# Patient Record
Sex: FEMALE | Race: BLACK OR AFRICAN AMERICAN | NOT HISPANIC OR LATINO | Employment: PART TIME | URBAN - METROPOLITAN AREA
[De-identification: names, ages, dates, MRNs, and addresses within clinical notes are randomized per-mention and may not be internally consistent; named-entity substitution may affect disease eponyms.]

---

## 2024-08-16 ENCOUNTER — APPOINTMENT (OUTPATIENT)
Dept: OBSTETRICS AND GYNECOLOGY | Facility: CLINIC | Age: 34
End: 2024-08-16
Payer: COMMERCIAL

## 2024-09-20 ENCOUNTER — APPOINTMENT (OUTPATIENT)
Dept: OBSTETRICS AND GYNECOLOGY | Facility: CLINIC | Age: 34
End: 2024-09-20
Payer: COMMERCIAL

## 2025-03-17 ENCOUNTER — APPOINTMENT (OUTPATIENT)
Dept: OBSTETRICS AND GYNECOLOGY | Facility: CLINIC | Age: 35
End: 2025-03-17
Payer: COMMERCIAL

## 2025-03-17 VITALS
DIASTOLIC BLOOD PRESSURE: 80 MMHG | HEIGHT: 63 IN | SYSTOLIC BLOOD PRESSURE: 120 MMHG | BODY MASS INDEX: 38.52 KG/M2 | WEIGHT: 217.4 LBS

## 2025-03-17 DIAGNOSIS — F43.21 GRIEF ASSOCIATED WITH LOSS OF FETUS: ICD-10-CM

## 2025-03-17 DIAGNOSIS — R19.00 PELVIC FULLNESS IN FEMALE: ICD-10-CM

## 2025-03-17 DIAGNOSIS — Z01.419 ENCOUNTER FOR ANNUAL ROUTINE GYNECOLOGICAL EXAMINATION: Primary | ICD-10-CM

## 2025-03-17 PROCEDURE — 87624 HPV HI-RISK TYP POOLED RSLT: CPT

## 2025-03-17 PROCEDURE — 3008F BODY MASS INDEX DOCD: CPT | Performed by: OBSTETRICS & GYNECOLOGY

## 2025-03-17 PROCEDURE — 99395 PREV VISIT EST AGE 18-39: CPT | Performed by: OBSTETRICS & GYNECOLOGY

## 2025-03-17 PROCEDURE — 88175 CYTOPATH C/V AUTO FLUID REDO: CPT

## 2025-03-17 ASSESSMENT — ENCOUNTER SYMPTOMS
LOSS OF SENSATION IN FEET: 0
DEPRESSION: 0
OCCASIONAL FEELINGS OF UNSTEADINESS: 0

## 2025-03-17 ASSESSMENT — PATIENT HEALTH QUESTIONNAIRE - PHQ9
1. LITTLE INTEREST OR PLEASURE IN DOING THINGS: NOT AT ALL
2. FEELING DOWN, DEPRESSED OR HOPELESS: NOT AT ALL
SUM OF ALL RESPONSES TO PHQ9 QUESTIONS 1 AND 2: 0

## 2025-03-17 ASSESSMENT — COLUMBIA-SUICIDE SEVERITY RATING SCALE - C-SSRS
1. IN THE PAST MONTH, HAVE YOU WISHED YOU WERE DEAD OR WISHED YOU COULD GO TO SLEEP AND NOT WAKE UP?: NO
6. HAVE YOU EVER DONE ANYTHING, STARTED TO DO ANYTHING, OR PREPARED TO DO ANYTHING TO END YOUR LIFE?: NO
2. HAVE YOU ACTUALLY HAD ANY THOUGHTS OF KILLING YOURSELF?: NO

## 2025-03-17 NOTE — PATIENT INSTRUCTIONS
Thanks for coming in today for your annual GYN exam.      A Pap smear was sent.  Results should be available in the next few weeks.      Consider follow-up with one of our psychologist to help with the loss of a pregnancy.      Arrange to have an ultrasound performed to evaluate for possible fibroid felt on today's exam.      Follow-up with your PCP and other healthcare specialist as needed.      Feel free to call the office with any problems, questions or concerns prior to your next scheduled visit.

## 2025-05-09 ENCOUNTER — OFFICE VISIT (OUTPATIENT)
Dept: OBSTETRICS AND GYNECOLOGY | Facility: CLINIC | Age: 35
End: 2025-05-09
Payer: COMMERCIAL

## 2025-05-09 VITALS
HEIGHT: 63 IN | BODY MASS INDEX: 38.34 KG/M2 | SYSTOLIC BLOOD PRESSURE: 124 MMHG | DIASTOLIC BLOOD PRESSURE: 80 MMHG | WEIGHT: 216.4 LBS

## 2025-05-09 DIAGNOSIS — R93.5 ABNORMAL ULTRASOUND OF ENDOMETRIUM: ICD-10-CM

## 2025-05-09 DIAGNOSIS — N83.202 CYST OF LEFT OVARY: ICD-10-CM

## 2025-05-09 DIAGNOSIS — D21.9 FIBROIDS: Primary | ICD-10-CM

## 2025-05-09 PROCEDURE — 3008F BODY MASS INDEX DOCD: CPT | Performed by: OBSTETRICS & GYNECOLOGY

## 2025-05-09 PROCEDURE — 99214 OFFICE O/P EST MOD 30 MIN: CPT | Performed by: OBSTETRICS & GYNECOLOGY

## 2025-05-09 ASSESSMENT — ENCOUNTER SYMPTOMS
EYES NEGATIVE: 0
ENDOCRINE NEGATIVE: 0
CARDIOVASCULAR NEGATIVE: 0
PSYCHIATRIC NEGATIVE: 0
RESPIRATORY NEGATIVE: 0
CONSTITUTIONAL NEGATIVE: 0
GASTROINTESTINAL NEGATIVE: 0
HEMATOLOGIC/LYMPHATIC NEGATIVE: 0
NEUROLOGICAL NEGATIVE: 0
ALLERGIC/IMMUNOLOGIC NEGATIVE: 0
MUSCULOSKELETAL NEGATIVE: 0

## 2025-05-09 ASSESSMENT — PAIN SCALES - GENERAL: PAINLEVEL_OUTOF10: 0-NO PAIN

## 2025-05-09 NOTE — PROGRESS NOTES
Assessment and Plan:  Dahiana Blanco is a 33 y/o obese  woman presenting today for follow up.     Diagnoses:  #1 Fibroids  #2 Cyst of left ovary  #3 Abnormal ultrasound of endometrium     Assessment/Plan:  1. Fibroid uterus  - Discussed with patient at length.  - [employer] pamphlet about fibroids provided.  - Patient is having normal cycles without any pelvic pain or pressure.    2. Cystic foci in the endometrium noted on recent ultrasound   - Discussed with patient findings.  - Discussed with patient management options, either follow up with ultrasound, endometrial sampling which was recommended to rule out hyperplasia or malignancy.  - Discussed with patient hysteroscopy in the office versus D&C with hysteroscopy.  - Will get a follow up ultrasound in 6 to 8 weeks to recheck.  - Patient to return to the office for likely endometrial biopsy versus hysteroscopy with biopsy.  - May also need to consider a sonohistogram.    3. Left ovarian cysts  - Discussed with patient repeat ultrasound in 2-3 cycles to check for stability or resolution.    4. Fertility  - Briefly discussed with patient AMA and high risk pregnancy.    Follow up with Dr. Rajput.    Vivian[facility] Attestation  By signing my name below, I, Justina Taveras, attest that this documentation has been prepared under the direction and in the presence of Nicky Rajput MD on 2025 at 6:29 PM.     HPI:   Dahiana Blanco is a 33 y/o obese  woman presenting today for follow up. She was seen for her annual exam in March. Fibroids were felt on exam and an ultrasound was ordered.     The patient eventually had a follow up ultrasound on May 2, 2025 at an outside facility that showed a 12.5 cm multifibroid uterus, the largest measuring 6.3 cm. The endometrial stripe was 12 mm and there was concern about an echogenic foci measuring 1.1 cm. The left ovary contained a 2 cm cystic lesion with internal septations.    She denies pelvic pain and heavy vaginal  bleeding today.    ROS:  Review of Systems   Genitourinary:  Negative for pelvic pain.        Negative for heavy vaginal bleeding.     Physical Exam:   Physical Exam  Constitutional:       General: She is not in acute distress.     Appearance: Normal appearance. She is obese.   Neurological:      Mental Status: She is alert and oriented to person, place, and time.      Comments: Pleasant and cooperative

## 2025-05-09 NOTE — PATIENT INSTRUCTIONS
Thanks for coming in today for follow up.     Arrange to have a follow up ultrasound performed in 8-10 weeks to recheck the lining of the uterus and your left ovarian cyst.     Review the information about fibroids and hysteroscopy.     Feel free to call the office with any problems, questions or concerns prior to your next scheduled visit.

## 2025-07-07 ENCOUNTER — APPOINTMENT (OUTPATIENT)
Dept: RADIOLOGY | Facility: HOSPITAL | Age: 35
End: 2025-07-07

## 2025-07-09 ENCOUNTER — APPOINTMENT (OUTPATIENT)
Dept: OBSTETRICS AND GYNECOLOGY | Facility: CLINIC | Age: 35
End: 2025-07-09
Payer: COMMERCIAL